# Patient Record
Sex: FEMALE | Race: WHITE
[De-identification: names, ages, dates, MRNs, and addresses within clinical notes are randomized per-mention and may not be internally consistent; named-entity substitution may affect disease eponyms.]

---

## 2024-01-12 ENCOUNTER — HOSPITAL ENCOUNTER (EMERGENCY)
Age: 44
LOS: 1 days | Discharge: HOME | End: 2024-01-13
Payer: COMMERCIAL

## 2024-01-12 VITALS
OXYGEN SATURATION: 98 % | RESPIRATION RATE: 18 BRPM | DIASTOLIC BLOOD PRESSURE: 98 MMHG | TEMPERATURE: 99.5 F | SYSTOLIC BLOOD PRESSURE: 158 MMHG | HEART RATE: 88 BPM

## 2024-01-12 VITALS — BODY MASS INDEX: 41.9 KG/M2

## 2024-01-12 DIAGNOSIS — M25.522: ICD-10-CM

## 2024-01-12 DIAGNOSIS — S80.01XA: Primary | ICD-10-CM
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
## 2024-01-12 PROCEDURE — 73562 X-RAY EXAM OF KNEE 3: CPT

## 2024-01-12 PROCEDURE — 99284 EMERGENCY DEPT VISIT MOD MDM: CPT

## 2024-01-12 PROCEDURE — 73080 X-RAY EXAM OF ELBOW: CPT

## 2024-01-12 NOTE — PC.NURSE
No redness or swelling and skin intact to right knee, small bruise starting at site, pt able to bend injured knee and ice placed to right knee.

## 2024-01-12 NOTE — PC.NURSE
No redness, bruising, swelling and skin at elbow intact, strong radial pulse to left wrist, Ice to site of complaint.

## 2024-01-12 NOTE — XR_ITS
The [address] [address] 
     [phone] 
  
  
Patient Name: 
SETH JUÁREZ 
  
MRN: [id number]    YOB: 1980    Sex: F 
Assigned Patient Location: ER 
Current Patient Location: ER 
Accession/Order Number: [id number] 
Exam Date: 1/12/2024  23:10    Report Date: 1/12/2024  23:47 
  
At the request of: 
KLARISSA SANTORO   
  
Procedure:  XR knee RT 3V 
  
EXAM: XR knee RT 3V  
  
HISTORY: The patient is a 43-year-old female, injury 
  
COMPARISON: None. 
  
FINDINGS: The right knee is radiographically negative with no evidence of  
fracture, dislocation, joint space narrowing, osteophytes, or other osseous or  
  
articular abnormalities. 
  
ORDER #: [id number] XR/XR knee RT 3V  
IMPRESSION:  
   
Negative.  
   
   
Electronically authenticated by: RHEA MARTÍNEZ   Date: 1/12/2024  23:47

## 2024-01-12 NOTE — ED.LOWEXI1
HPI - Extremity Injury (Lower)
General
Chief Complaint: Extremity Injury, Lower
Stated Complaint: FALL Injury
Time Seen by Provider: 01/12/24 22:54
Source: patient
Mode of arrival: walk-in
History of Present Illness
HPI Narrative: 
slipped on plastic at work and fell onto her right knee. Also mild injury to the left elbow. States only mild pain at the elbow and demonstrates use of the elbow without discomfort. Denies other injury. 
Related Data
Allergies

Allergy/AdvReac Type Severity Reaction Status Date / Time
No Known Drug Allergies Allergy   Verified 01/12/24 22:34



Review of Systems
ROS  
 Status of ROS 10 or more systems reviewed and unremarkable except as noted in history and below   

Exam
Constitutional
Vital Signs, click to edit/add: 

Last Vital Signs

Temp  99.5 F   01/12/24 22:29
Pulse  88   01/12/24 22:29
Resp  18   01/12/24 22:29
BP  158/98 H  01/12/24 22:29
Pulse Ox  98   01/12/24 22:29
O2 Del Method  Room Air  01/12/24 22:29



Common normals: no apparent distress, average body habitus, oriented x3, no limitations, healthy appearing and alert
HENMT
Common normals: normocephalic and head/scalp atraumatic
Respiratory
Common normals: normal respiratory effort, no retractions and no use of accessory muscles
GI
Common normals: Normal to inspection, nondistended, normoactive bowel sounds present, soft to palpation and non-tender
Extremity
Other: 
mild tenderness right patella. not able to fully extend left elbow from old injury but otherwise able to use LUE without difficulty
Neuro
Common normals: oriented x3, CN's II-XII intact bilaterally, moves all extremities and no focal motor deficits
Psych
Appearance: grossly normal

Course
Vital Signs
Vital signs: 

Vital Signs

Temperature  99.5 F   01/12/24 22:29
Pulse Rate  88   01/12/24 22:29
Respiratory Rate  18   01/12/24 22:29
Blood Pressure  158/98 H  01/12/24 22:29
Pulse Oximetry  98   01/12/24 22:29
Oxygen Delivery Method  Room Air  01/12/24 22:29



Temperature  99.5 F   01/12/24 22:29
Pulse Rate  88   01/12/24 22:29
Respiratory Rate  18   01/12/24 22:29
Blood Pressure  158/98 H  01/12/24 22:29
Pulse Oximetry  98   01/12/24 22:29
Oxygen Delivery Method  Room Air  01/12/24 22:29




MDM - Extremity Injury (Lower)
MDM Narrative
Medical decision making narrative: 
patient presents after slip and fall injury at work. landed on her right knee. mild injury left elbow. xray right knee neg for fracture. Exam with evidence of mild knee contusion. Patient states she is not scheduled to return to work until 1/16/24. 
I suspect by then she should be able to return to work without restrictions. Discharged home with working diagnosis of right knee contusion
Imaging Data
Chest x-ray: 
      Radiologist's impression: 

ITS Impressions

Knee X-Ray  01/12/24 22:57
IMPRESSION:
 
Negative.
 
 
Electronically authenticated by: RHEA MARTÍNEZ   Date: 1/12/2024  23:47





Discharge Plan
Discharge
Chief Complaint: Extremity Injury, Lower

Clinical Impression:
 Contusion of knee, right


Patient Disposition: Home, Self-Care

Instructions:  Contusion in Adults (ED)

Stand Alone Forms:  Portal Instructions

Referrals:
Physician,Non-Staff, MD [Primary Care Provider] - 1 week

## 2024-01-12 NOTE — ED_ITS
HPI - Extremity Injury (Lower)    
General    
Chief Complaint: Extremity Injury, Lower    
Stated Complaint: FALL Injury    
Time Seen by Provider: 01/12/24 22:54    
Source: patient    
Mode of arrival: walk-in    
History of Present Illness    
HPI Narrative:     
slipped on plastic at work and fell onto her right knee. Also mild injury to the  
left elbow. States only mild pain at the elbow and demonstrates use of the elbow  
without discomfort. Denies other injury.     
Related Data    
                                    Allergies    
    
    
    
Allergy/AdvReac Type Severity Reaction Status Date / Time    
     
No Known Drug Allergies Allergy   Verified 01/12/24 22:34    
    
    
    
    
Review of Systems    
    
    
ROS      
    
 Status of ROS                          10 or more systems reviewed and unremark    
able except as noted in     
history and below       
    
    
Exam    
Constitutional    
Vital Signs, click to edit/add:     
    
                                Last Vital Signs    
    
    
    
Temp  99.5 F   01/12/24 22:29    
     
Pulse  88   01/12/24 22:29    
     
Resp  18   01/12/24 22:29    
     
BP  158/98 H  01/12/24 22:29    
     
Pulse Ox  98   01/12/24 22:29    
     
O2 Del Method  Room Air  01/12/24 22:29    
    
    
    
    
Common normals: no apparent distress, average body habitus, oriented x3, no   
limitations, healthy appearing and alert    
HENMT    
Common normals: normocephalic and head/scalp atraumatic    
Respiratory    
Common normals: normal respiratory effort, no retractions and no use of accesso  
ry muscles    
GI    
Common normals: Normal to inspection, nondistended, normoactive bowel sounds   
present, soft to palpation and non-tender    
Extremity    
Other:     
mild tenderness right patella. not able to fully extend left elbow from old   
injury but otherwise able to use LUE without difficulty    
Neuro    
Common normals: oriented x3, CN's II-XII intact bilaterally, moves all   
extremities and no focal motor deficits    
Psych    
Appearance: grossly normal    
    
Course    
Vital Signs    
Vital signs:     
    
                                   Vital Signs    
    
    
    
Temperature  99.5 F   01/12/24 22:29    
     
Pulse Rate  88   01/12/24 22:29    
     
Respiratory Rate  18   01/12/24 22:29    
     
Blood Pressure  158/98 H  01/12/24 22:29    
     
Pulse Oximetry  98   01/12/24 22:29    
     
Oxygen Delivery Method  Room Air  01/12/24 22:29    
    
    
                                            
    
    
    
Temperature  99.5 F   01/12/24 22:29    
     
Pulse Rate  88   01/12/24 22:29    
     
Respiratory Rate  18   01/12/24 22:29    
     
Blood Pressure  158/98 H  01/12/24 22:29    
     
Pulse Oximetry  98   01/12/24 22:29    
     
Oxygen Delivery Method  Room Air  01/12/24 22:29    
    
    
    
    
    
MDM - Extremity Injury (Lower)    
MDM Narrative    
Medical decision making narrative:     
patient presents after slip and fall injury at work. landed on her right knee.   
mild injury left elbow. xray right knee neg for fracture. Exam with evidence of   
mild knee contusion. Patient states she is not scheduled to return to work until  
1/16/24. I suspect by then she should be able to return to work without   
restrictions. Discharged home with working diagnosis of right knee contusion    
Imaging Data    
Chest x-ray:     
      Radiologist's impression:     
    
ITS Impressions    
    
Knee X-Ray  01/12/24 22:57    
IMPRESSION:    
     
Negative.    
     
     
Electronically authenticated by: RHEA MARTÍNEZ   Date: 1/12/2024  23:47    
    
    
    
    
    
Discharge Plan    
Discharge    
Chief Complaint: Extremity Injury, Lower    
    
Clinical Impression:    
 Contusion of knee, right    
    
    
Patient Disposition: Home, Self-Care    
    
Instructions:  Contusion in Adults (ED)    
    
Stand Alone Forms:  Portal Instructions    
    
Referrals:    
Physician,Non-Staff, MD [Primary Care Provider] - 1 week

## 2024-01-13 VITALS
RESPIRATION RATE: 16 BRPM | DIASTOLIC BLOOD PRESSURE: 94 MMHG | OXYGEN SATURATION: 99 % | SYSTOLIC BLOOD PRESSURE: 162 MMHG | HEART RATE: 90 BPM

## 2024-01-13 NOTE — XR_ITS
The [address] [address] 
     [phone] 
  
  
Patient Name: 
SETH JUÁREZ 
  
MRN: [id number]    YOB: 1980    Sex: F 
Assigned Patient Location: ER 
Current Patient Location: ER 
Accession/Order Number: [id number] 
Exam Date: 1/13/2024  00:58    Report Date: 1/13/2024  02:00 
  
At the request of: 
KLARISSA SANTORO   
  
Procedure:  XR elbow LT min 3V 
  
Examination:XR elbow LT min 3V 
  
INDICATION:injury 
  
COMPARISON:None. 
  
TECHNIQUE:3 left elbow views are submitted. 
  
FINDINGS:There is some image degradation on this examination since patient is  
unable to fully extend her arm due to trauma and clinical status. There is a  
bony fragment with sharp edges on the lateral projection located along the  
distal posterior humerus which may represent an acute fracture arising from  
the  
olecranon there are other smoothly marginated osseous fragments located  
adjacent to the medial humeral condyle which may be sequela to old trauma  
given  
the smooth edges. A definitive radial head fracture cannot be appreciated.  
There is a large joint effusion on the lateral projection. 
  
ORDER #: [id number] XR/XR elbow LT min 3V  
IMPRESSION:  
   
There may be an acute fracture arising from the olecranon based on the lateral 
  
   
projection. Further evaluation with CT scan would be of benefit.  
   
   
Electronically authenticated by: TRE IBARRA   Date: 1/13/2024  02:00

## 2024-01-17 ENCOUNTER — HOSPITAL ENCOUNTER
Dept: HOSPITAL 101 - RAD | Age: 44
Discharge: HOME | End: 2024-01-17
Payer: SELF-PAY

## 2024-01-17 DIAGNOSIS — M25.522: Primary | ICD-10-CM

## 2024-01-17 DIAGNOSIS — S52.045A: ICD-10-CM

## 2024-01-17 PROCEDURE — 73200 CT UPPER EXTREMITY W/O DYE: CPT

## 2024-01-17 NOTE — XMS_ITS
Comprehensive CCD (C-CDA v2.1)  
  
                          Created on: 2024  
  
  
SETH JUÁREZ  
External Reference #: [id number],PersonID:[id number]  
: 1980  
Sex: Female  
  
Demographics  
  
  
                                        Address             [address] [place] [address] [place]  [address]  
   
                                        Home Phone          [phone]  
   
                                        Home Phone          [phone]  
   
                                        Home Phone          [phone]  
   
                                        Home Phone          [phone]  
   
                                        Preferred Language  en  
   
                                        Marital Status      Single  
   
                                        Orthodox Affiliation Unknown  
   
                                        Race                White  
   
                                        Ethnic Group        Unknown  
  
  
Author  
  
  
                                        Name                Unknown  
   
                                        Address             [address] [place] [address]  
[address]  
[place]  [address]  
   
                                        Phone               [phone]  
   
                                        Organization        [facility]  
  
  
Care Team Providers  
  
  
                                Care Team Member Name Role            Phone  
   
                                JOY, DR SAUL MARIANO Admitting       Unavailable  
   
                                JOY, DR SAUL MARIANO Attending       Unavailable  
   
                                DR SAUL HAMILTON Consulting      Unavailable  
  
  
  
Problems  
  
  
                      Problem Classification Problem    Date       Documented Da  
te Episodic/Chronic  
   
                                                    E Codes: Fall  
(1 source)                              Fall (on) (from)   
unspecified stairs   
and steps, initial   
encounter;   
Translations: [FALL   
ON FROM UNS STAIRS   
STEPS INIT]                             Onset:   
08-                                          Episodic  
   
                                                    Other injuries and   
conditions due to   
external causes  
(3 sources)                             Unspecified injury   
of right shoulder   
and upper arm,   
initial encounter;   
Translations: [UNS   
INJ RT SHOULDER UP   
ARM INITIAL]                            Onset:   
2022                                          Episodic  
   
                                                    Sprains and strains  
(1 source)                              Strain of   
unspecified muscle,   
fascia and tendon   
at shoulder and   
upper arm level,   
right arm, initial   
encounter;   
Translations: [STRN   
UNS MSC F TND SHLDR   
UA RA INIT]                             Onset:   
08-                                          Episodic  
  
  
  
Encounters  
  
  
                          Encounter Date Encounter Type Care Provider Facility  
   
                                                    Start: 2022  
End: 2022     ambulatory          DR SAUL HAMILTON     Facility:[facility]  
  
  
  
Payers  
  
  
                          Date         Payer Category Payer        Policy ID  
   
                          1980   Unknown                   [id number]  
[id number]  
   
                          1959   Self-pay                  [id number]  
  
  
  
Summary Purpose  
  
  
                                                      
  
  
  
Family History  
No Family History Records Found  
  
Advance Directives  
No Advanced Directives Records Found  
  
Additional Source Comments  
  
  
  
                                                    INFORMATION SOURCE (unrecogn  
ized section and content)  
   
                                          
  
  
  
                                        DATE CREATED        AUTHOR  
   
                                08/10/2022                      [facility]  
  
  
FOR RECORDS PERTAINING TO PATIENTS WHO ARE OR HAVE BEEN ENROLLED IN A CHEMICAL 
DEPENDENCY/SUBSTANCEABUSE PROGRAM, SOME INFORMATION MAY BE OMITTED. This 
clinical summary was aggregated from multiple sources. Caution should be 
exercised in using it in the provision of clinical care. This summary normalizes
information from multiple sources, and as a consequence, information in this 
document may materially change the coding, format and clinical context of 
patient data. In addition, data may be omitted in some cases. CLINICAL DECISIONS
SHOULD BE BASED ON THE PRIMARY CLINICAL RECORDS. [facility] [employer] [facility]. provides 
no warranty or guarantee of the accuracy or completeness of information in this 
document.

## 2024-01-17 NOTE — CT_ITS
The [address] [address] 
     [phone] 
  
  
Patient Name: 
SETH JUÁREZ 
  
MRN: [id number]    YOB: 1980    Sex: F 
Assigned Patient Location: RAD 
Current Patient Location: [facility] 
Accession/Order Number: [id number] 
Exam Date: 1/17/2024  10:50    Report Date: 1/17/2024  11:18 
  
At the request of: 
JUAN ANTONIO AMAYA   
  
Procedure:  CT elbow LT wo con 
  
EXAMINATION: CT elbow LT wo con  
  
HISTORY: Abnormal plain x-ray 
  
COMPARISON: 1/13/2024 elbow x-ray 
  
TECHNIQUE: Multi-planar CT images were created without IV contrast. Dose  
reduction techniques were achieved by using automated exposure control and/or  
adjustment of mA and/or kV according to patient size and/or use of iterative  
reconstruction technique. 
  
FINDINGS:  
BONES: Lucency is identified through the medial anterior coronoid process best  
  
seen on axial image 20 and 21 with the fracture fragment measuring 10.7 x 7.0  
mm on axial image 21 and 12 mm on sagittal image #18. The fracture extends to  
the articular surface with no distraction or angulation. No additional  
fracture. No dislocation. Moderate degenerative changes with marginal  
osteophyte formation.  
SOFT TISSUES: Mild posterior soft tissue swelling  
EFFUSION: None visible.  
OTHER: Negative.  
  
ORDER #: [id number] CT/CT elbow LT wo con  
IMPRESSION:   
   
Subacute nondisplaced nonangulated 10.7 x 7.0 x 12 mm fracture along the   
anterior medial coronoid process of the proximal ulna. No significant bony   
bridging and bone formation is observed  
   
   
Electronically authenticated by: CRISTO CAI   Date: 1/17/2024  11:18

## 2024-02-02 ENCOUNTER — HOSPITAL ENCOUNTER (OUTPATIENT)
Dept: GENERAL RADIOLOGY | Age: 44
End: 2024-02-02
Payer: COMMERCIAL

## 2024-02-02 ENCOUNTER — HOSPITAL ENCOUNTER (OUTPATIENT)
Age: 44
Discharge: HOME | End: 2024-02-02
Payer: COMMERCIAL

## 2024-02-02 DIAGNOSIS — M25.522 LEFT ELBOW PAIN: ICD-10-CM

## 2024-02-02 PROCEDURE — 73080 X-RAY EXAM OF ELBOW: CPT

## 2025-07-27 ENCOUNTER — HOSPITAL ENCOUNTER (EMERGENCY)
Age: 45
Discharge: HOME | End: 2025-07-27
Payer: COMMERCIAL

## 2025-07-27 VITALS — HEART RATE: 98 BPM | OXYGEN SATURATION: 98 % | DIASTOLIC BLOOD PRESSURE: 100 MMHG | SYSTOLIC BLOOD PRESSURE: 176 MMHG

## 2025-07-27 VITALS
TEMPERATURE: 98.06 F | DIASTOLIC BLOOD PRESSURE: 118 MMHG | SYSTOLIC BLOOD PRESSURE: 137 MMHG | HEART RATE: 115 BPM | OXYGEN SATURATION: 99 %

## 2025-07-27 VITALS — BODY MASS INDEX: 44.3 KG/M2

## 2025-07-27 DIAGNOSIS — K80.50: Primary | ICD-10-CM

## 2025-07-27 LAB
ADD MANUAL DIFF: NO
ALANINE AMINOTRANSFERASE: 67 U/L (ref 14–59)
ALBUMIN GLOBULIN RATIO: 0.9
ALBUMIN LEVEL: 4.3 G/DL (ref 3.4–5)
ALKALINE PHOSPHATASE: 126 U/L (ref 46–116)
ANION GAP: 17.1
ASPARTATE AMINO TRANSFERASE: 46 U/L (ref 15–37)
BASOPHILS # BLD AUTO: 0 10^3/UL (ref 0–0.1)
BASOPHILS NFR BLD AUTO: 0.2 % (ref 0.2–2)
BILIRUBIN TOTAL: 1 MG/DL (ref 0.2–1)
BUN SERPL-MCNC: 7 MG/DL (ref 7–18)
BUN/CREAT SERPL: 10.8
CALCIUM: 9.5 MG/DL (ref 8.5–10.1)
CHLORIDE: 101 MMOL/L (ref 98–107)
CO2 BLD-SCNC: 25.7 MMOL/L (ref 21–32)
CREAT SERPL-SCNC: 0.65 MG/DL (ref 0.55–1.02)
EOSINOPHIL # BLD AUTO: 0.1 10^3/UL (ref 0–0.7)
EOSINOPHIL NFR BLD AUTO: 0.4 % (ref 0.9–7)
ERYTHROCYTE [DISTWIDTH] IN BLOOD BY AUTOMATED COUNT: 14.3 % (ref 11–15)
ESTIMATED GFR (AFRICAN AMERICA: >60
ESTIMATED GFR (NON-AFRICAN AME: >60
GLOBULIN: 4.9 G/DL
GLUCOSE SERPLBLD-MCNC: 128 MG/DL (ref 74–106)
HCT VFR BLD CALC: 49.3 % (ref 36–48)
HEMOGLOBIN: 16.4 G/DL (ref 12–16)
IMMATURE GRANULOCYTES ABS AUTO: 0.04 10^3/UL (ref 0–0.03)
IMMATURE GRANULOCYTES PCT AUTO: 0.3 % (ref 0–0.5)
LYMPHOCYTES  ABSOLUTE AUTO: 0.9 10^3/UL (ref 1.2–3.8)
LYMPHOCYTES PERCENT AUTO: 6.6 % (ref 20.5–60)
MCH RBC QN AUTO: 29 PG (ref 26.7–34)
MCV RBC: 87.3 FL (ref 81–99)
MEAN CORPUSCULAR HGB CONC: 33.3 G/DL (ref 29.9–35.2)
MEAN PLATELET VOLUME: 10.1 FL (ref 9.5–13.5)
MONOCYTES # BLD AUTO: 1 10^3/UL (ref 0.3–0.8)
MONOCYTES NFR BLD AUTO: 7.4 % (ref 1.7–12)
NEUTROPHILS # BLD AUTO: 11.6 10^3/UL (ref 1.4–6.5)
NEUTROPHILS NFR BLD AUTO: 85.1 % (ref 43–75)
PLATELET # BLD: 251 10^3/UL (ref 150–450)
POTASSIUM SERPLBLD-SCNC: 3.8 MMOL/L (ref 3.5–5.1)
RBC # BLD AUTO: 5.65 10^6/UL (ref 4.2–5.4)
SODIUM BLD-SCNC: 140 MMOL/L (ref 136–145)
TOTAL PROTEIN: 9.2 G/DL (ref 6.4–8.2)
WBC # BLD: 13.6 10^3/UL (ref 4–11)

## 2025-07-27 PROCEDURE — 96375 TX/PRO/DX INJ NEW DRUG ADDON: CPT

## 2025-07-27 PROCEDURE — 85025 COMPLETE CBC W/AUTO DIFF WBC: CPT

## 2025-07-27 PROCEDURE — 99285 EMERGENCY DEPT VISIT HI MDM: CPT

## 2025-07-27 PROCEDURE — 81001 URINALYSIS AUTO W/SCOPE: CPT

## 2025-07-27 PROCEDURE — 96374 THER/PROPH/DIAG INJ IV PUSH: CPT

## 2025-07-27 PROCEDURE — 84703 CHORIONIC GONADOTROPIN ASSAY: CPT

## 2025-07-27 PROCEDURE — 74177 CT ABD & PELVIS W/CONTRAST: CPT

## 2025-07-27 PROCEDURE — 80053 COMPREHEN METABOLIC PANEL: CPT

## 2025-07-27 PROCEDURE — 36415 COLL VENOUS BLD VENIPUNCTURE: CPT
